# Patient Record
Sex: FEMALE | Race: OTHER | NOT HISPANIC OR LATINO
[De-identification: names, ages, dates, MRNs, and addresses within clinical notes are randomized per-mention and may not be internally consistent; named-entity substitution may affect disease eponyms.]

---

## 2022-08-30 ENCOUNTER — NON-APPOINTMENT (OUTPATIENT)
Age: 47
End: 2022-08-30

## 2022-08-31 PROBLEM — Z00.00 ENCOUNTER FOR PREVENTIVE HEALTH EXAMINATION: Status: ACTIVE | Noted: 2022-08-31

## 2022-09-01 ENCOUNTER — NON-APPOINTMENT (OUTPATIENT)
Age: 47
End: 2022-09-01

## 2022-09-01 ENCOUNTER — APPOINTMENT (OUTPATIENT)
Dept: GASTROENTEROLOGY | Facility: CLINIC | Age: 47
End: 2022-09-01

## 2022-09-01 VITALS
DIASTOLIC BLOOD PRESSURE: 72 MMHG | BODY MASS INDEX: 26.9 KG/M2 | SYSTOLIC BLOOD PRESSURE: 108 MMHG | OXYGEN SATURATION: 97 % | WEIGHT: 137 LBS | TEMPERATURE: 98.5 F | HEART RATE: 76 BPM | HEIGHT: 60 IN

## 2022-09-01 DIAGNOSIS — Z01.818 ENCOUNTER FOR OTHER PREPROCEDURAL EXAMINATION: ICD-10-CM

## 2022-09-01 DIAGNOSIS — Z12.11 ENCOUNTER FOR SCREENING FOR MALIGNANT NEOPLASM OF COLON: ICD-10-CM

## 2022-09-01 PROCEDURE — 99203 OFFICE O/P NEW LOW 30 MIN: CPT

## 2022-09-01 RX ORDER — POLYETHYLENE GLYCOL-3350 AND ELECTROLYTES 236; 6.74; 5.86; 2.97; 22.74 G/274.31G; G/274.31G; G/274.31G; G/274.31G; G/274.31G
236 POWDER, FOR SOLUTION ORAL
Qty: 1 | Refills: 0 | Status: ACTIVE | COMMUNITY
Start: 2022-09-01 | End: 1900-01-01

## 2022-09-02 LAB
ALBUMIN SERPL ELPH-MCNC: 5 G/DL
ALP BLD-CCNC: 65 U/L
ALT SERPL-CCNC: 40 U/L
AST SERPL-CCNC: 29 U/L
BASOPHILS # BLD AUTO: 0.02 K/UL
BASOPHILS NFR BLD AUTO: 0.3 %
BILIRUB DIRECT SERPL-MCNC: 0.2 MG/DL
BILIRUB INDIRECT SERPL-MCNC: 0.5 MG/DL
BILIRUB SERPL-MCNC: 0.7 MG/DL
EOSINOPHIL # BLD AUTO: 0.06 K/UL
EOSINOPHIL NFR BLD AUTO: 0.8 %
HCT VFR BLD CALC: 40.4 %
HGB BLD-MCNC: 12.8 G/DL
IMM GRANULOCYTES NFR BLD AUTO: 0.4 %
LYMPHOCYTES # BLD AUTO: 1.56 K/UL
LYMPHOCYTES NFR BLD AUTO: 19.5 %
MAN DIFF?: NORMAL
MCHC RBC-ENTMCNC: 30.8 PG
MCHC RBC-ENTMCNC: 31.7 GM/DL
MCV RBC AUTO: 97.1 FL
MONOCYTES # BLD AUTO: 0.43 K/UL
MONOCYTES NFR BLD AUTO: 5.4 %
NEUTROPHILS # BLD AUTO: 5.89 K/UL
NEUTROPHILS NFR BLD AUTO: 73.6 %
PLATELET # BLD AUTO: 255 K/UL
PROT SERPL-MCNC: 7.4 G/DL
RBC # BLD: 4.16 M/UL
RBC # FLD: 12.4 %
WBC # FLD AUTO: 7.99 K/UL

## 2022-09-08 NOTE — ASSESSMENT
[FreeTextEntry1] : This is a 47 year old female here for an evaluation of epigastric pain radiating to the back.\par Likely the pain is associated with gallbladder stones\par \par My plan\par -referral to Dr. Flores\par -colonoscopy\par -GaviLyte\par -covid swab\par -If removal of gallbladder does not resolve symptoms will consider EGD\par -c/w PPI\par \par Risks, benefits, and alternatives of colonoscopy discussed at length with patient including but not limited to bleeding , perforation, anesthesia related complication, aspiration, etc. Patient expressed understanding.\par \par Colonoscopy for evaluation of polyps, tumors, nodules with +/- biopsy and or cauterization w/ and or w/o clipping. \par

## 2022-09-08 NOTE — HISTORY OF PRESENT ILLNESS
[de-identified] : This is a 47 year old female here for an evaluation of epigastric pain radiating to the back. PMH of hysterectomy, gerd. Grandmother with ovarian cancer. Uncle with pancreatic cancer at age 70. Denies any difficulty swallowing. Has been experiencing reflux for many years. On Omeprazole 40mg daily with help. No N&V. Reports the epigastric pain is worse when eating greasy, acidic foods , and drinking alcoholic beverages. Had abdominal sono on 8/12/22 showed gallbladder filled with stones. Last EGD 16 years go and normal. No blood or dark tarry stools. Normal caliber. Never had an colonoscopy. Weight stable. \par Smoke: none\par Etoh: socially

## 2022-09-15 ENCOUNTER — TRANSCRIPTION ENCOUNTER (OUTPATIENT)
Age: 47
End: 2022-09-15

## 2022-09-27 ENCOUNTER — APPOINTMENT (OUTPATIENT)
Dept: SURGERY | Facility: CLINIC | Age: 47
End: 2022-09-27

## 2022-09-27 VITALS
WEIGHT: 141 LBS | TEMPERATURE: 97 F | HEART RATE: 75 BPM | BODY MASS INDEX: 27.68 KG/M2 | SYSTOLIC BLOOD PRESSURE: 119 MMHG | HEIGHT: 60 IN | DIASTOLIC BLOOD PRESSURE: 79 MMHG

## 2022-09-27 DIAGNOSIS — Z83.3 FAMILY HISTORY OF DIABETES MELLITUS: ICD-10-CM

## 2022-09-27 DIAGNOSIS — Z82.49 FAMILY HISTORY OF ISCHEMIC HEART DISEASE AND OTHER DISEASES OF THE CIRCULATORY SYSTEM: ICD-10-CM

## 2022-09-27 DIAGNOSIS — Z87.891 PERSONAL HISTORY OF NICOTINE DEPENDENCE: ICD-10-CM

## 2022-09-27 DIAGNOSIS — Z80.41 FAMILY HISTORY OF MALIGNANT NEOPLASM OF OVARY: ICD-10-CM

## 2022-09-27 DIAGNOSIS — Z80.3 FAMILY HISTORY OF MALIGNANT NEOPLASM OF BREAST: ICD-10-CM

## 2022-09-27 DIAGNOSIS — Z82.3 FAMILY HISTORY OF STROKE: ICD-10-CM

## 2022-09-27 DIAGNOSIS — Z78.9 OTHER SPECIFIED HEALTH STATUS: ICD-10-CM

## 2022-09-27 DIAGNOSIS — Z80.0 FAMILY HISTORY OF MALIGNANT NEOPLASM OF DIGESTIVE ORGANS: ICD-10-CM

## 2022-09-27 DIAGNOSIS — Z86.19 PERSONAL HISTORY OF OTHER INFECTIOUS AND PARASITIC DISEASES: ICD-10-CM

## 2022-09-27 DIAGNOSIS — Z80.42 FAMILY HISTORY OF MALIGNANT NEOPLASM OF PROSTATE: ICD-10-CM

## 2022-09-27 LAB — SARS-COV-2 N GENE NPH QL NAA+PROBE: NOT DETECTED

## 2022-09-27 PROCEDURE — 99203 OFFICE O/P NEW LOW 30 MIN: CPT

## 2022-09-29 ENCOUNTER — RESULT REVIEW (OUTPATIENT)
Age: 47
End: 2022-09-29

## 2022-09-29 ENCOUNTER — TRANSCRIPTION ENCOUNTER (OUTPATIENT)
Age: 47
End: 2022-09-29

## 2022-09-29 ENCOUNTER — INPATIENT (INPATIENT)
Facility: HOSPITAL | Age: 47
LOS: 0 days | Discharge: HOME | DRG: 419 | End: 2022-09-30
Attending: SURGERY | Admitting: SURGERY
Payer: MEDICAID

## 2022-09-29 VITALS
RESPIRATION RATE: 18 BRPM | WEIGHT: 139.99 LBS | HEART RATE: 85 BPM | DIASTOLIC BLOOD PRESSURE: 80 MMHG | HEIGHT: 60 IN | OXYGEN SATURATION: 99 % | SYSTOLIC BLOOD PRESSURE: 118 MMHG | TEMPERATURE: 98 F

## 2022-09-29 DIAGNOSIS — K80.50 CALCULUS OF BILE DUCT WITHOUT CHOLANGITIS OR CHOLECYSTITIS WITHOUT OBSTRUCTION: ICD-10-CM

## 2022-09-29 LAB
ABO RH CONFIRMATION: SIGNIFICANT CHANGE UP
ALBUMIN SERPL ELPH-MCNC: 4.3 G/DL — SIGNIFICANT CHANGE UP (ref 3.5–5)
ALP SERPL-CCNC: 105 U/L — SIGNIFICANT CHANGE UP (ref 40–120)
ALT FLD-CCNC: 831 U/L DA — HIGH (ref 10–60)
ANION GAP SERPL CALC-SCNC: 4 MMOL/L — LOW (ref 5–17)
APPEARANCE UR: CLEAR — SIGNIFICANT CHANGE UP
AST SERPL-CCNC: 842 U/L — HIGH (ref 10–40)
BASOPHILS # BLD AUTO: 0.01 K/UL — SIGNIFICANT CHANGE UP (ref 0–0.2)
BASOPHILS NFR BLD AUTO: 0.2 % — SIGNIFICANT CHANGE UP (ref 0–2)
BILIRUB SERPL-MCNC: 4 MG/DL — HIGH (ref 0.2–1.2)
BILIRUB UR-MCNC: ABNORMAL
BLD GP AB SCN SERPL QL: SIGNIFICANT CHANGE UP
BUN SERPL-MCNC: 8 MG/DL — SIGNIFICANT CHANGE UP (ref 7–18)
CALCIUM SERPL-MCNC: 9.9 MG/DL — SIGNIFICANT CHANGE UP (ref 8.4–10.5)
CHLORIDE SERPL-SCNC: 106 MMOL/L — SIGNIFICANT CHANGE UP (ref 96–108)
CO2 SERPL-SCNC: 27 MMOL/L — SIGNIFICANT CHANGE UP (ref 22–31)
COLOR SPEC: YELLOW — SIGNIFICANT CHANGE UP
CREAT SERPL-MCNC: 0.61 MG/DL — SIGNIFICANT CHANGE UP (ref 0.5–1.3)
DIFF PNL FLD: ABNORMAL
EGFR: 112 ML/MIN/1.73M2 — SIGNIFICANT CHANGE UP
EOSINOPHIL # BLD AUTO: 0.01 K/UL — SIGNIFICANT CHANGE UP (ref 0–0.5)
EOSINOPHIL NFR BLD AUTO: 0.2 % — SIGNIFICANT CHANGE UP (ref 0–6)
FLUAV AG NPH QL: SIGNIFICANT CHANGE UP
FLUBV AG NPH QL: SIGNIFICANT CHANGE UP
GLUCOSE SERPL-MCNC: 99 MG/DL — SIGNIFICANT CHANGE UP (ref 70–99)
GLUCOSE UR QL: NEGATIVE — SIGNIFICANT CHANGE UP
HCG SERPL-ACNC: <1 MIU/ML — SIGNIFICANT CHANGE UP
HCT VFR BLD CALC: 42.7 % — SIGNIFICANT CHANGE UP (ref 34.5–45)
HGB BLD-MCNC: 14.3 G/DL — SIGNIFICANT CHANGE UP (ref 11.5–15.5)
IMM GRANULOCYTES NFR BLD AUTO: 0.4 % — SIGNIFICANT CHANGE UP (ref 0–0.9)
KETONES UR-MCNC: NEGATIVE — SIGNIFICANT CHANGE UP
LEUKOCYTE ESTERASE UR-ACNC: NEGATIVE — SIGNIFICANT CHANGE UP
LIDOCAIN IGE QN: 75 U/L — SIGNIFICANT CHANGE UP (ref 73–393)
LYMPHOCYTES # BLD AUTO: 0.78 K/UL — LOW (ref 1–3.3)
LYMPHOCYTES # BLD AUTO: 14.8 % — SIGNIFICANT CHANGE UP (ref 13–44)
MCHC RBC-ENTMCNC: 30.2 PG — SIGNIFICANT CHANGE UP (ref 27–34)
MCHC RBC-ENTMCNC: 33.5 GM/DL — SIGNIFICANT CHANGE UP (ref 32–36)
MCV RBC AUTO: 90.3 FL — SIGNIFICANT CHANGE UP (ref 80–100)
MONOCYTES # BLD AUTO: 0.22 K/UL — SIGNIFICANT CHANGE UP (ref 0–0.9)
MONOCYTES NFR BLD AUTO: 4.2 % — SIGNIFICANT CHANGE UP (ref 2–14)
NEUTROPHILS # BLD AUTO: 4.23 K/UL — SIGNIFICANT CHANGE UP (ref 1.8–7.4)
NEUTROPHILS NFR BLD AUTO: 80.2 % — HIGH (ref 43–77)
NITRITE UR-MCNC: NEGATIVE — SIGNIFICANT CHANGE UP
NRBC # BLD: 0 /100 WBCS — SIGNIFICANT CHANGE UP (ref 0–0)
PH UR: 7 — SIGNIFICANT CHANGE UP (ref 5–8)
PLATELET # BLD AUTO: 267 K/UL — SIGNIFICANT CHANGE UP (ref 150–400)
POTASSIUM SERPL-MCNC: 4 MMOL/L — SIGNIFICANT CHANGE UP (ref 3.5–5.3)
POTASSIUM SERPL-SCNC: 4 MMOL/L — SIGNIFICANT CHANGE UP (ref 3.5–5.3)
PROT SERPL-MCNC: 8.3 G/DL — SIGNIFICANT CHANGE UP (ref 6–8.3)
PROT UR-MCNC: NEGATIVE — SIGNIFICANT CHANGE UP
RBC # BLD: 4.73 M/UL — SIGNIFICANT CHANGE UP (ref 3.8–5.2)
RBC # FLD: 11.9 % — SIGNIFICANT CHANGE UP (ref 10.3–14.5)
SARS-COV-2 RNA SPEC QL NAA+PROBE: SIGNIFICANT CHANGE UP
SODIUM SERPL-SCNC: 137 MMOL/L — SIGNIFICANT CHANGE UP (ref 135–145)
SP GR SPEC: 1.01 — SIGNIFICANT CHANGE UP (ref 1.01–1.02)
UROBILINOGEN FLD QL: 4
WBC # BLD: 5.27 K/UL — SIGNIFICANT CHANGE UP (ref 3.8–10.5)
WBC # FLD AUTO: 5.27 K/UL — SIGNIFICANT CHANGE UP (ref 3.8–10.5)

## 2022-09-29 PROCEDURE — 47564 LAPARO CHOLECYSTECTOMY/EXPLR: CPT

## 2022-09-29 PROCEDURE — 88304 TISSUE EXAM BY PATHOLOGIST: CPT | Mod: 26

## 2022-09-29 PROCEDURE — 76705 ECHO EXAM OF ABDOMEN: CPT | Mod: 26

## 2022-09-29 PROCEDURE — 93010 ELECTROCARDIOGRAM REPORT: CPT

## 2022-09-29 PROCEDURE — 47550 BILE DUCT ENDOSCOPY ADD-ON: CPT

## 2022-09-29 PROCEDURE — 99222 1ST HOSP IP/OBS MODERATE 55: CPT | Mod: 57

## 2022-09-29 PROCEDURE — 99284 EMERGENCY DEPT VISIT MOD MDM: CPT

## 2022-09-29 DEVICE — IMPLANTABLE DEVICE: Type: IMPLANTABLE DEVICE | Status: FUNCTIONAL

## 2022-09-29 DEVICE — AIRWAY BASKET ZERO TIP 12MM: Type: IMPLANTABLE DEVICE | Status: FUNCTIONAL

## 2022-09-29 DEVICE — CLIP APPLIER COVIDIEN ENDOCLIP II 10MM MED/LG: Type: IMPLANTABLE DEVICE | Status: FUNCTIONAL

## 2022-09-29 RX ORDER — KETOROLAC TROMETHAMINE 30 MG/ML
30 SYRINGE (ML) INJECTION EVERY 6 HOURS
Refills: 0 | Status: COMPLETED | OUTPATIENT
Start: 2022-09-29 | End: 2022-09-30

## 2022-09-29 RX ORDER — SODIUM CHLORIDE 9 MG/ML
1000 INJECTION, SOLUTION INTRAVENOUS
Refills: 0 | Status: DISCONTINUED | OUTPATIENT
Start: 2022-09-29 | End: 2022-09-29

## 2022-09-29 RX ORDER — ENOXAPARIN SODIUM 100 MG/ML
40 INJECTION SUBCUTANEOUS EVERY 24 HOURS
Refills: 0 | Status: DISCONTINUED | OUTPATIENT
Start: 2022-09-29 | End: 2022-09-29

## 2022-09-29 RX ORDER — ONDANSETRON 8 MG/1
4 TABLET, FILM COATED ORAL ONCE
Refills: 0 | Status: COMPLETED | OUTPATIENT
Start: 2022-09-29 | End: 2022-09-29

## 2022-09-29 RX ORDER — FENTANYL CITRATE 50 UG/ML
50 INJECTION INTRAVENOUS
Refills: 0 | Status: DISCONTINUED | OUTPATIENT
Start: 2022-09-29 | End: 2022-09-29

## 2022-09-29 RX ORDER — ACETAMINOPHEN 500 MG
1000 TABLET ORAL ONCE
Refills: 0 | Status: DISCONTINUED | OUTPATIENT
Start: 2022-09-29 | End: 2022-09-29

## 2022-09-29 RX ORDER — DEXTROSE MONOHYDRATE, SODIUM CHLORIDE, AND POTASSIUM CHLORIDE 50; .745; 4.5 G/1000ML; G/1000ML; G/1000ML
1000 INJECTION, SOLUTION INTRAVENOUS
Refills: 0 | Status: DISCONTINUED | OUTPATIENT
Start: 2022-09-29 | End: 2022-09-29

## 2022-09-29 RX ORDER — SODIUM CHLORIDE 9 MG/ML
1000 INJECTION INTRAMUSCULAR; INTRAVENOUS; SUBCUTANEOUS ONCE
Refills: 0 | Status: COMPLETED | OUTPATIENT
Start: 2022-09-29 | End: 2022-09-29

## 2022-09-29 RX ORDER — FENTANYL CITRATE 50 UG/ML
25 INJECTION INTRAVENOUS
Refills: 0 | Status: DISCONTINUED | OUTPATIENT
Start: 2022-09-29 | End: 2022-09-29

## 2022-09-29 RX ORDER — ONDANSETRON 8 MG/1
4 TABLET, FILM COATED ORAL EVERY 6 HOURS
Refills: 0 | Status: DISCONTINUED | OUTPATIENT
Start: 2022-09-29 | End: 2022-09-29

## 2022-09-29 RX ORDER — ONDANSETRON 8 MG/1
4 TABLET, FILM COATED ORAL ONCE
Refills: 0 | Status: DISCONTINUED | OUTPATIENT
Start: 2022-09-29 | End: 2022-09-29

## 2022-09-29 RX ORDER — MORPHINE SULFATE 50 MG/1
4 CAPSULE, EXTENDED RELEASE ORAL ONCE
Refills: 0 | Status: DISCONTINUED | OUTPATIENT
Start: 2022-09-29 | End: 2022-09-29

## 2022-09-29 RX ORDER — HYDROMORPHONE HYDROCHLORIDE 2 MG/ML
0.5 INJECTION INTRAMUSCULAR; INTRAVENOUS; SUBCUTANEOUS
Refills: 0 | Status: DISCONTINUED | OUTPATIENT
Start: 2022-09-29 | End: 2022-09-29

## 2022-09-29 RX ORDER — HYDROMORPHONE HYDROCHLORIDE 2 MG/ML
0.5 INJECTION INTRAMUSCULAR; INTRAVENOUS; SUBCUTANEOUS
Refills: 0 | Status: DISCONTINUED | OUTPATIENT
Start: 2022-09-29 | End: 2022-09-30

## 2022-09-29 RX ADMIN — ONDANSETRON 4 MILLIGRAM(S): 8 TABLET, FILM COATED ORAL at 10:40

## 2022-09-29 RX ADMIN — SODIUM CHLORIDE 1000 MILLILITER(S): 9 INJECTION INTRAMUSCULAR; INTRAVENOUS; SUBCUTANEOUS at 09:23

## 2022-09-29 RX ADMIN — MORPHINE SULFATE 4 MILLIGRAM(S): 50 CAPSULE, EXTENDED RELEASE ORAL at 13:23

## 2022-09-29 RX ADMIN — HYDROMORPHONE HYDROCHLORIDE 0.5 MILLIGRAM(S): 2 INJECTION INTRAMUSCULAR; INTRAVENOUS; SUBCUTANEOUS at 20:19

## 2022-09-29 RX ADMIN — MORPHINE SULFATE 4 MILLIGRAM(S): 50 CAPSULE, EXTENDED RELEASE ORAL at 10:41

## 2022-09-29 RX ADMIN — Medication 30 MILLIGRAM(S): at 23:45

## 2022-09-29 RX ADMIN — HYDROMORPHONE HYDROCHLORIDE 0.5 MILLIGRAM(S): 2 INJECTION INTRAMUSCULAR; INTRAVENOUS; SUBCUTANEOUS at 20:35

## 2022-09-29 RX ADMIN — SODIUM CHLORIDE 1000 MILLILITER(S): 9 INJECTION INTRAMUSCULAR; INTRAVENOUS; SUBCUTANEOUS at 13:23

## 2022-09-29 RX ADMIN — Medication 30 MILLIGRAM(S): at 23:10

## 2022-09-29 NOTE — BRIEF OPERATIVE NOTE - OPERATION/FINDINGS
Laparoscopic cholecystectomy, intraoperative cholangiogram, exploration of common bile, removal of common bile duct stone

## 2022-09-29 NOTE — H&P ADULT - NSHPLABSRESULTS_GEN_ALL_CORE
14.3   5.27  )-----------( 267      ( 29 Sep 2022 10:10 )             42.7   09-29    137  |  106  |  8   ----------------------------<  99  4.0   |  27  |  0.61    Ca    9.9      29 Sep 2022 10:10    TPro  8.3  /  Alb  4.3  /  TBili  4.0<H>  /  DBili  x   /  AST  842<H>  /  ALT  831<H>  /  AlkPhos  105  09-29        < from: US Abdomen Upper Quadrant Right (09.29.22 @ 11:48) >      ACC: [id number] EXAM:  US ABDOMEN RT UPR QUADRANT                          PROCEDURE DATE:  09/29/2022          INTERPRETATION:  CLINICAL INFORMATION: Right upper quadrant abdominal   pain.    COMPARISON: None available.    TECHNIQUE: Sonography of the right upper quadrant.    FINDINGS:  Liver: Increased echogenicity.  Bile ducts: Normal caliber. Common bile duct measures 5 mm.  Gallbladder: Cholelithiasis.  Pancreas: Visualized portions are within normal limits.  Right kidney: 11.6 cm. No hydronephrosis.  Ascites: None.  IVC: Visualized portions are within normal limits.    IMPRESSION:  Cholelithiasis.  Hepatic steatosis    --- End of Report --      DEREK JAIN MD; Attending Radiologist  This document has been electronically signed. Sep 29 2022 12:27PM    < end of copied text >

## 2022-09-29 NOTE — ED ADULT NURSE NOTE - COVID-19 RESULT DATE/TIME
23-Sep-2022 19:47

## 2022-09-29 NOTE — ED ADULT NURSE NOTE - EXTENSIONS OF SELF_ADULT
None

## 2022-09-29 NOTE — PACU DISCHARGE NOTE - NAUSEA/VOMITING:
Left message for Nancy to call back and reschedule appointment with Lisa Velazquez to another day.    None

## 2022-09-29 NOTE — ED PROVIDER NOTE - OBJECTIVE STATEMENT
47 year old female lmp Oct 2018 with pmhx of cholecystolithiasis and acid reflux presents with constant sever (10/10) upper right quadrant abd pain since last night.  She's on omeprazole for acid reflux. Reports nausea, vomiting x1, diarrhea. Denies fever. Patient took Tylenol but finds no relief. Denies previous abdominal surgery. She has surgery scheduled in October.  NKDA.

## 2022-09-29 NOTE — H&P ADULT - HISTORY OF PRESENT ILLNESS
47F w/ PSHx hysterectomy presenting c/o RUQ and epigastric pain associated with nausea/vomiting x1 day. Pt states pain is radiating to her right shoulder and back. States pain began at 1pm, however worsened overnight. Denied fever or chills.

## 2022-09-29 NOTE — ED ADULT NURSE NOTE - NS_SISCREENINGSR_GEN_ALL_ED
Brief Postoperative Note    Patient: Bernabe Rodriguez  YOB: 1957  MRN: [id number]    Date of Procedure: 5/13/2021     Pre-Op Diagnosis: SCREENING    Post-Op Diagnosis: Same as preoperative diagnosis. Procedure(s):  COLONOSCOPY    Surgeon(s):  Kathya Gorman MD    Surgical Assistant: Surg Asst-1: Sydney Blandon    Anesthesia: MAC     Estimated Blood Loss (mL): 0    Complications: None    Specimens: * No specimens in log *     Implants: * No implants in log *    Drains: * No LDAs found *    Findings: 1. Diverticulosis  2.  Internal hemorrhoids     Electronically Signed by Fredrick Gross MD on 5/13/2021 at 11:52 AM Negative

## 2022-09-29 NOTE — CHART NOTE - NSCHARTNOTEFT_GEN_A_CORE
Pt POD 0 s/p lap tania  resting comfortably  no n/v  voided    Vital Signs Last 24 Hrs  T(C): 36.5 (29 Sep 2022 21:31), Max: 36.8 (29 Sep 2022 20:50)  T(F): 97.7 (29 Sep 2022 21:31), Max: 98.2 (29 Sep 2022 20:50)  HR: 67 (29 Sep 2022 21:31) (63 - 85)  BP: 99/58 (29 Sep 2022 21:31) (99/58 - 124/73)  BP(mean): 68 (29 Sep 2022 20:50) (68 - 85)  RR: 18 (29 Sep 2022 21:31) (10 - 18)  SpO2: 96% (29 Sep 2022 21:31) (95% - 100%)    Parameters below as of 29 Sep 2022 21:31  Patient On (Oxygen Delivery Method): room air    abd soft, NT, inc CDI    stable post-op

## 2022-09-29 NOTE — H&P ADULT - ASSESSMENT
47F with cholelithiasis, r/o choledocholithiasis    no leukocytosis, afebrile  minimal RUQ +ttp  lipase wnl, Tbili 4    -Admit to surgery under Dr. Flores  -Plan for laparoscopic cholecystectomy today with IOC  -NPO, IVF  -Pain control PRN  -DVT prophylaxis

## 2022-09-29 NOTE — H&P ADULT - NSHPPHYSICALEXAM_GEN_ALL_CORE
Vital Signs Last 24 Hrs  T(C): 36.6 (29 Sep 2022 11:29), Max: 36.7 (29 Sep 2022 08:59)  T(F): 97.9 (29 Sep 2022 11:29), Max: 98.1 (29 Sep 2022 08:59)  HR: 70 (29 Sep 2022 11:29) (70 - 85)  BP: 105/68 (29 Sep 2022 11:29) (105/68 - 118/80)  RR: 17 (29 Sep 2022 11:29) (17 - 18)  SpO2: 98% (29 Sep 2022 11:29) (98% - 99%)    Parameters below as of 29 Sep 2022 11:29  Patient On (Oxygen Delivery Method): room air    Gen: A&Ox3, NAD  Chest: respiration unlabored  Abdomen: soft, nondistended, mild RUQ abdominal pain. No guarding  Ext: no calf pain, no edema

## 2022-09-30 ENCOUNTER — TRANSCRIPTION ENCOUNTER (OUTPATIENT)
Age: 47
End: 2022-09-30

## 2022-09-30 VITALS
DIASTOLIC BLOOD PRESSURE: 63 MMHG | OXYGEN SATURATION: 98 % | RESPIRATION RATE: 18 BRPM | SYSTOLIC BLOOD PRESSURE: 98 MMHG | TEMPERATURE: 98 F | HEART RATE: 84 BPM

## 2022-09-30 LAB
CULTURE RESULTS: SIGNIFICANT CHANGE UP
SPECIMEN SOURCE: SIGNIFICANT CHANGE UP

## 2022-09-30 PROCEDURE — 87637 SARSCOV2&INF A&B&RSV AMP PRB: CPT

## 2022-09-30 PROCEDURE — 76705 ECHO EXAM OF ABDOMEN: CPT

## 2022-09-30 PROCEDURE — 99285 EMERGENCY DEPT VISIT HI MDM: CPT | Mod: 25

## 2022-09-30 PROCEDURE — 86850 RBC ANTIBODY SCREEN: CPT

## 2022-09-30 PROCEDURE — 96361 HYDRATE IV INFUSION ADD-ON: CPT

## 2022-09-30 PROCEDURE — 83690 ASSAY OF LIPASE: CPT

## 2022-09-30 PROCEDURE — 87086 URINE CULTURE/COLONY COUNT: CPT

## 2022-09-30 PROCEDURE — 96375 TX/PRO/DX INJ NEW DRUG ADDON: CPT

## 2022-09-30 PROCEDURE — 84702 CHORIONIC GONADOTROPIN TEST: CPT

## 2022-09-30 PROCEDURE — 36415 COLL VENOUS BLD VENIPUNCTURE: CPT

## 2022-09-30 PROCEDURE — 96374 THER/PROPH/DIAG INJ IV PUSH: CPT

## 2022-09-30 PROCEDURE — 86900 BLOOD TYPING SEROLOGIC ABO: CPT

## 2022-09-30 PROCEDURE — 88304 TISSUE EXAM BY PATHOLOGIST: CPT

## 2022-09-30 PROCEDURE — 86901 BLOOD TYPING SEROLOGIC RH(D): CPT

## 2022-09-30 PROCEDURE — 76000 FLUOROSCOPY <1 HR PHYS/QHP: CPT

## 2022-09-30 PROCEDURE — C1889: CPT

## 2022-09-30 PROCEDURE — 85025 COMPLETE CBC W/AUTO DIFF WBC: CPT

## 2022-09-30 PROCEDURE — 80053 COMPREHEN METABOLIC PANEL: CPT

## 2022-09-30 PROCEDURE — 81001 URINALYSIS AUTO W/SCOPE: CPT

## 2022-09-30 RX ORDER — ACETAMINOPHEN 500 MG
1000 TABLET ORAL ONCE
Refills: 0 | Status: COMPLETED | OUTPATIENT
Start: 2022-09-30 | End: 2022-09-30

## 2022-09-30 RX ADMIN — Medication 30 MILLIGRAM(S): at 06:05

## 2022-09-30 RX ADMIN — Medication 30 MILLIGRAM(S): at 05:26

## 2022-09-30 RX ADMIN — Medication 400 MILLIGRAM(S): at 07:01

## 2022-09-30 RX ADMIN — Medication 30 MILLIGRAM(S): at 11:00

## 2022-09-30 RX ADMIN — Medication 30 MILLIGRAM(S): at 11:21

## 2022-09-30 NOTE — DISCHARGE NOTE PROVIDER - NSDCFUSCHEDAPPT_GEN_ALL_CORE_FT
Chest x-ray no masses , , NO Pneumonia       Suggest a trail of Acid reflux medications .       
Jose Thompson [facility] PreAdmits  Scheduled Appointment: 09/30/2022    [facility]  [phone]   Scheduled Appointment: 11/03/2022    [facility]  [phone]  Scheduled Appointment: 11/14/2022

## 2022-09-30 NOTE — DISCHARGE NOTE NURSING/CASE MANAGEMENT/SOCIAL WORK - NSDCPEFALRISK_GEN_ALL_CORE
For information on Fall & Injury Prevention, visit: [email or web address] OR  [email or web address] OR  https://www.cdc.gov/steadi/patient.html

## 2022-09-30 NOTE — DISCHARGE NOTE PROVIDER - NSDCCPTREATMENT_GEN_ALL_CORE_FT
PRINCIPAL PROCEDURE  Procedure: Cholecystectomy, laparoscopic, with intraoperative cholangiogram and biliary tract exploration  Findings and Treatment:

## 2022-09-30 NOTE — DISCHARGE NOTE PROVIDER - CARE PROVIDER_API CALL
Vivek Flores (MD)  Surgery  [address] [place] [address]  Phone: [phone]  Fax: [phone]  Follow Up Time:

## 2022-09-30 NOTE — PATIENT PROFILE ADULT - FALL HARM RISK - HARM RISK INTERVENTIONS

## 2022-09-30 NOTE — PROGRESS NOTE ADULT - SUBJECTIVE AND OBJECTIVE BOX
Patient seen and examined at bedside, POD1 laparoscopic tania, IOC, CBD exploration, removal of CBD stone with Spyglass. No events overnight, has minimal pain primarily to epigastric port site that is well controlled on her medication regimen. Has not ambulated or attempted PO yet. Denies additional complaint, no CP, SOB, n/v/d.    Vital Signs Last 24 Hrs  T(C): 35.6 (30 Sep 2022 06:16), Max: 36.8 (29 Sep 2022 20:50)  T(F): 96 (30 Sep 2022 06:16), Max: 98.2 (29 Sep 2022 20:50)  HR: 70 (30 Sep 2022 06:16) (63 - 85)  BP: 97/60 (30 Sep 2022 06:16) (95/51 - 124/73)  BP(mean): 71 (30 Sep 2022 00:01) (68 - 85)  RR: 18 (30 Sep 2022 06:16) (10 - 18)  SpO2: 100% (30 Sep 2022 06:16) (94% - 100%)    Parameters below as of 30 Sep 2022 06:16  Patient On (Oxygen Delivery Method): room air    Gen: well appearing, no acute distress, sleeping comfortably  Neuro: A+Ox3  Resp: normal resp effort, non-labored  Abd: 5 port sites well approximated, covered in steri-strips, no bleeding. mildly tender to palpation primarily in epigastrium. soft, nondistended  Ext: FROM all 4 extremities    A/P  47F POD1 lap tania with IOC, CBD exploration, and removal of CBD stones, recovering well without concern  - monitor PO intake  - ambulate  - void  - monitor vitals  - pain control prn  - dc home in pm with follow up

## 2022-09-30 NOTE — DISCHARGE NOTE PROVIDER - HOSPITAL COURSE
47F w/ PSHx hysterectomy presented to ED complaining of RUQ and epigastric pain associated with nausea/vomiting for 1 day. Pt stated pain is radiating to her right shoulder and back. States pain began at 1pm, however worsened overnight. Ultrasound showed cholelithiasis and mild CBD dilation concerning for retained stone and pt was brought for lap tania with IOC. She was found to have filling defect on IOC and CBD exploration was performed, and stone was removed. Pt recovered well post-operatively, she remained afebrile and hemodynamically stable, her pain was well-controlled and she tolerated diet, ambulated without issue, and voided. She was discharged on post-op day 1 in good condition and with close follow up.

## 2022-09-30 NOTE — DISCHARGE NOTE NURSING/CASE MANAGEMENT/SOCIAL WORK - PATIENT PORTAL LINK FT
You can access the FollowMyHealth Patient Portal offered by [facility] by registering at the following website: [email or web address]. By joining [facility]’s FollowMyHealth portal, you will also be able to view your health information using other applications (apps) compatible with our system.

## 2022-10-03 PROBLEM — K21.9 GASTRO-ESOPHAGEAL REFLUX DISEASE WITHOUT ESOPHAGITIS: Chronic | Status: ACTIVE | Noted: 2022-09-29

## 2022-10-03 PROBLEM — K80.20 CALCULUS OF GALLBLADDER WITHOUT CHOLECYSTITIS WITHOUT OBSTRUCTION: Chronic | Status: ACTIVE | Noted: 2022-09-29

## 2022-10-04 LAB — SURGICAL PATHOLOGY STUDY: SIGNIFICANT CHANGE UP

## 2022-10-05 NOTE — HISTORY OF PRESENT ILLNESS
[de-identified] : 48 yo woman referred by GI Dr Thompson for symptomatic cholelithiasis. She reports RUQ abdominal pain radiating to the back related to eating fatty foods. US shows gallstones.

## 2022-10-05 NOTE — PHYSICAL EXAM
[Normal Breath Sounds] : Normal breath sounds [Normal Heart Sounds] : normal heart sounds [No Rash or Lesion] : No rash or lesion [Alert] : alert [Calm] : calm [de-identified] : NAD [de-identified] : WNL [de-identified] : ANNEL [de-identified] : Abdomen ,soft, minimal RUQ tenderness, no palpable mass

## 2022-10-05 NOTE — ASSESSMENT
[FreeTextEntry1] : 46 yo woman with symptomatic cholelithiasis. \par Plan for robotic cholecystectomy

## 2022-10-11 ENCOUNTER — APPOINTMENT (OUTPATIENT)
Dept: SURGERY | Facility: CLINIC | Age: 47
End: 2022-10-11

## 2022-10-11 VITALS
DIASTOLIC BLOOD PRESSURE: 74 MMHG | WEIGHT: 140 LBS | HEART RATE: 80 BPM | BODY MASS INDEX: 27.48 KG/M2 | HEIGHT: 60 IN | SYSTOLIC BLOOD PRESSURE: 112 MMHG

## 2022-10-11 PROCEDURE — 99024 POSTOP FOLLOW-UP VISIT: CPT

## 2022-10-11 NOTE — REASON FOR VISIT
[Post Op: _________] : a [unfilled] post op visit [FreeTextEntry1] : s/p Laparoscopic cholecystectomy with cholangiography. Laparoscopic transcystic common bile duct exploration.Choledochoscopy.\par

## 2022-10-11 NOTE — ASSESSMENT
[FreeTextEntry1] : ELENITA SNOW is a 47 year old female who underwent a Laparoscopic cholecystectomy with cholangiography. Laparoscopic transcystic common bile duct exploration.Choledochoscopy on 09/30/2022 pathology results are consistent with Chronic calculous cholecystitis\par \par She has had an uneventful recovery. \par \par Plan to check LFTs\par f/u PRN

## 2022-10-11 NOTE — PHYSICAL EXAM
[Normal Breath Sounds] : Normal breath sounds [Normal Heart Sounds] : normal heart sounds [No Rash or Lesion] : No rash or lesion [Alert] : alert [Calm] : calm [de-identified] : NAD [de-identified] : WNL [de-identified] : ANNEL [de-identified] : Incisions healing well, small bruising around the 5-mm laparoscope port site.

## 2022-10-11 NOTE — HISTORY OF PRESENT ILLNESS
[de-identified] : ELENITA SNOW is a 47 year old female who presents in the office for postop  visit. She underwent a Laparoscopic cholecystectomy with cholangiography. Laparoscopic transcystic common bile duct exploration.Choledochoscopy on 09/30/2022 pathology results are consistent with Chronic calculous cholecystitis. \par \par She is doing well, on regular diet, afebrile, anicteric, normal BMs, reports "gas". She is concerned about some bruising and discomfort around the 5-mm laparoscope port. There is no palpable hematoma.

## 2022-10-21 ENCOUNTER — EMERGENCY (EMERGENCY)
Facility: HOSPITAL | Age: 47
LOS: 1 days | Discharge: HOME | End: 2022-10-21
Attending: EMERGENCY MEDICINE
Payer: MEDICAID

## 2022-10-21 VITALS
RESPIRATION RATE: 16 BRPM | TEMPERATURE: 98 F | DIASTOLIC BLOOD PRESSURE: 71 MMHG | HEIGHT: 60 IN | OXYGEN SATURATION: 97 % | HEART RATE: 86 BPM | SYSTOLIC BLOOD PRESSURE: 107 MMHG | WEIGHT: 139.99 LBS

## 2022-10-21 LAB
ALBUMIN SERPL ELPH-MCNC: 4.2 G/DL — SIGNIFICANT CHANGE UP (ref 3.5–5)
ALP SERPL-CCNC: 66 U/L — SIGNIFICANT CHANGE UP (ref 40–120)
ALT FLD-CCNC: 28 U/L DA — SIGNIFICANT CHANGE UP (ref 10–60)
ANION GAP SERPL CALC-SCNC: 6 MMOL/L — SIGNIFICANT CHANGE UP (ref 5–17)
AST SERPL-CCNC: 16 U/L — SIGNIFICANT CHANGE UP (ref 10–40)
BASOPHILS # BLD AUTO: 0.01 K/UL — SIGNIFICANT CHANGE UP (ref 0–0.2)
BASOPHILS NFR BLD AUTO: 0.2 % — SIGNIFICANT CHANGE UP (ref 0–2)
BILIRUB SERPL-MCNC: 0.7 MG/DL — SIGNIFICANT CHANGE UP (ref 0.2–1.2)
BUN SERPL-MCNC: 17 MG/DL — SIGNIFICANT CHANGE UP (ref 7–18)
CALCIUM SERPL-MCNC: 9.6 MG/DL — SIGNIFICANT CHANGE UP (ref 8.4–10.5)
CHLORIDE SERPL-SCNC: 106 MMOL/L — SIGNIFICANT CHANGE UP (ref 96–108)
CO2 SERPL-SCNC: 27 MMOL/L — SIGNIFICANT CHANGE UP (ref 22–31)
CREAT SERPL-MCNC: 0.6 MG/DL — SIGNIFICANT CHANGE UP (ref 0.5–1.3)
EGFR: 112 ML/MIN/1.73M2 — SIGNIFICANT CHANGE UP
EOSINOPHIL # BLD AUTO: 0.05 K/UL — SIGNIFICANT CHANGE UP (ref 0–0.5)
EOSINOPHIL NFR BLD AUTO: 0.9 % — SIGNIFICANT CHANGE UP (ref 0–6)
GLUCOSE SERPL-MCNC: 90 MG/DL — SIGNIFICANT CHANGE UP (ref 70–99)
HCG UR QL: NEGATIVE — SIGNIFICANT CHANGE UP
HCT VFR BLD CALC: 39.6 % — SIGNIFICANT CHANGE UP (ref 34.5–45)
HGB BLD-MCNC: 12.8 G/DL — SIGNIFICANT CHANGE UP (ref 11.5–15.5)
IMM GRANULOCYTES NFR BLD AUTO: 0.2 % — SIGNIFICANT CHANGE UP (ref 0–0.9)
LACTATE SERPL-SCNC: 0.9 MMOL/L — SIGNIFICANT CHANGE UP (ref 0.7–2)
LIDOCAIN IGE QN: 83 U/L — SIGNIFICANT CHANGE UP (ref 73–393)
LYMPHOCYTES # BLD AUTO: 1.46 K/UL — SIGNIFICANT CHANGE UP (ref 1–3.3)
LYMPHOCYTES # BLD AUTO: 25.4 % — SIGNIFICANT CHANGE UP (ref 13–44)
MCHC RBC-ENTMCNC: 29.8 PG — SIGNIFICANT CHANGE UP (ref 27–34)
MCHC RBC-ENTMCNC: 32.3 GM/DL — SIGNIFICANT CHANGE UP (ref 32–36)
MCV RBC AUTO: 92.1 FL — SIGNIFICANT CHANGE UP (ref 80–100)
MONOCYTES # BLD AUTO: 0.4 K/UL — SIGNIFICANT CHANGE UP (ref 0–0.9)
MONOCYTES NFR BLD AUTO: 7 % — SIGNIFICANT CHANGE UP (ref 2–14)
NEUTROPHILS # BLD AUTO: 3.82 K/UL — SIGNIFICANT CHANGE UP (ref 1.8–7.4)
NEUTROPHILS NFR BLD AUTO: 66.3 % — SIGNIFICANT CHANGE UP (ref 43–77)
NRBC # BLD: 0 /100 WBCS — SIGNIFICANT CHANGE UP (ref 0–0)
PLATELET # BLD AUTO: 273 K/UL — SIGNIFICANT CHANGE UP (ref 150–400)
POTASSIUM SERPL-MCNC: 3.9 MMOL/L — SIGNIFICANT CHANGE UP (ref 3.5–5.3)
POTASSIUM SERPL-SCNC: 3.9 MMOL/L — SIGNIFICANT CHANGE UP (ref 3.5–5.3)
PROT SERPL-MCNC: 7.8 G/DL — SIGNIFICANT CHANGE UP (ref 6–8.3)
RBC # BLD: 4.3 M/UL — SIGNIFICANT CHANGE UP (ref 3.8–5.2)
RBC # FLD: 11.9 % — SIGNIFICANT CHANGE UP (ref 10.3–14.5)
SARS-COV-2 RNA SPEC QL NAA+PROBE: SIGNIFICANT CHANGE UP
SODIUM SERPL-SCNC: 139 MMOL/L — SIGNIFICANT CHANGE UP (ref 135–145)
WBC # BLD: 5.75 K/UL — SIGNIFICANT CHANGE UP (ref 3.8–10.5)
WBC # FLD AUTO: 5.75 K/UL — SIGNIFICANT CHANGE UP (ref 3.8–10.5)

## 2022-10-21 PROCEDURE — 96374 THER/PROPH/DIAG INJ IV PUSH: CPT

## 2022-10-21 PROCEDURE — 81025 URINE PREGNANCY TEST: CPT

## 2022-10-21 PROCEDURE — 99284 EMERGENCY DEPT VISIT MOD MDM: CPT | Mod: 25

## 2022-10-21 PROCEDURE — 71045 X-RAY EXAM CHEST 1 VIEW: CPT | Mod: 26

## 2022-10-21 PROCEDURE — 71045 X-RAY EXAM CHEST 1 VIEW: CPT

## 2022-10-21 PROCEDURE — 84702 CHORIONIC GONADOTROPIN TEST: CPT

## 2022-10-21 PROCEDURE — 80053 COMPREHEN METABOLIC PANEL: CPT

## 2022-10-21 PROCEDURE — 85025 COMPLETE CBC W/AUTO DIFF WBC: CPT

## 2022-10-21 PROCEDURE — 87040 BLOOD CULTURE FOR BACTERIA: CPT

## 2022-10-21 PROCEDURE — 83690 ASSAY OF LIPASE: CPT

## 2022-10-21 PROCEDURE — 99285 EMERGENCY DEPT VISIT HI MDM: CPT

## 2022-10-21 PROCEDURE — 87635 SARS-COV-2 COVID-19 AMP PRB: CPT

## 2022-10-21 PROCEDURE — 83605 ASSAY OF LACTIC ACID: CPT

## 2022-10-21 PROCEDURE — 87086 URINE CULTURE/COLONY COUNT: CPT

## 2022-10-21 PROCEDURE — 36415 COLL VENOUS BLD VENIPUNCTURE: CPT

## 2022-10-21 RX ORDER — SODIUM CHLORIDE 9 MG/ML
1000 INJECTION INTRAMUSCULAR; INTRAVENOUS; SUBCUTANEOUS ONCE
Refills: 0 | Status: COMPLETED | OUTPATIENT
Start: 2022-10-21 | End: 2022-10-21

## 2022-10-21 RX ORDER — MORPHINE SULFATE 50 MG/1
2 CAPSULE, EXTENDED RELEASE ORAL ONCE
Refills: 0 | Status: DISCONTINUED | OUTPATIENT
Start: 2022-10-21 | End: 2022-10-21

## 2022-10-21 RX ADMIN — MORPHINE SULFATE 2 MILLIGRAM(S): 50 CAPSULE, EXTENDED RELEASE ORAL at 11:07

## 2022-10-21 RX ADMIN — SODIUM CHLORIDE 1000 MILLILITER(S): 9 INJECTION INTRAMUSCULAR; INTRAVENOUS; SUBCUTANEOUS at 11:04

## 2022-10-21 NOTE — ED PROVIDER NOTE - PHYSICAL EXAMINATION
Gen.  no acute distress  HEENT:  perrl eomi  Lungs:  b/l bs  CVS: S1S2   Abd;  soft no guarding. mild erythema around dressing sites consistent with skin reaction. central incision site with clear / yellow drainage min. +ttp over lower abd  Ext:  no edema no erythema  Neuro: aaox3   MSK: strength 5/5 b/l  upper and lower ext.

## 2022-10-21 NOTE — CONSULT NOTE ADULT - ATTENDING COMMENTS
Patient presents with opening of the lower camera port site. There is no purulent discharge, no redness, no signs of infection.     No further intervention needed - patient will follow up in my office.   Wound care instructions given to patient.

## 2022-10-21 NOTE — ED PROVIDER NOTE - CLINICAL SUMMARY MEDICAL DECISION MAKING FREE TEXT BOX
ATTG: : abd pain / fever / recent surgery concerns include but not limited to infection / inflammation / asbcess / obstruction will check labs, check ct check cultures,urinalysis and re eval for dispo

## 2022-10-21 NOTE — CONSULT NOTE ADULT - SUBJECTIVE AND OBJECTIVE BOX
47F with history of laparoscopic cholecystectomy with intraoperative cholangiogram that was positive for filling defect, common bile duct exploration, and removal of duct stones, presenting with incisional pain and fever to 101. Patient reports the pain around her incisions improved over her post-op period but never fully went away, and was always worst over the lower abdominal port site. She never removed any of the steri strips until yesterday when she saw some green/yellow discharge mixed with blood from the lower abdominal port site incision, and redness around it. At that time she took her temperature which was over 101. She is tolerating PO without nausea, vomiting, or increase in her abdominal pain, denies changes in bowel habits or urinary complaint.    Vital Signs Last 24 Hrs  T(C): 36.7 (21 Oct 2022 09:07), Max: 36.7 (21 Oct 2022 09:07)  T(F): 98 (21 Oct 2022 09:07), Max: 98 (21 Oct 2022 09:07)  HR: 86 (21 Oct 2022 09:07) (86 - 86)  BP: 107/71 (21 Oct 2022 09:07) (107/71 - 107/71)  BP(mean): --  RR: 16 (21 Oct 2022 09:07) (16 - 16)  SpO2: 97% (21 Oct 2022 09:07) (97% - 97%)  Parameters below as of 21 Oct 2022 09:07  Patient On (Oxygen Delivery Method): room air    General: well appearing, no acute distress  Resp: non-labored, normal resp effort  Abd: 4 port sites, lower abdominal port site with evidence of discharge that was crusted over, skin not well healed, blood able to be expressed but no additional discharge, no induration, no fluctuance. 3 other port sites well healed, mildly tender and w minor skin irritation  Ext: FROM all four extremities, peripheral pulses palpable    LABS             12.8   5.75  )-----------( 273      ( 21 Oct 2022 10:50 )             39.6     139  |  106  |  17  ----------------------------<  90  3.9   |  27  |  0.60    Ca    9.6      21 Oct 2022 10:50    TPro  7.8  /  Alb  4.2  /  TBili  0.7  /  DBili  x   /  AST  16  /  ALT  28  /  AlkPhos  66  10-21    IMAGING  CXR: unremarkable  CT abd/pel: pending

## 2022-10-21 NOTE — ED ADULT TRIAGE NOTE - CHIEF COMPLAINT QUOTE
s/p gallbladder surgery 9/29/22 with Dr Flores reports abdominal pain, fever overnight of 101 and one of my incisions had pus last night. Tylenol taken last at 6am

## 2022-10-21 NOTE — ED PROVIDER NOTE - PROGRESS NOTE DETAILS
patient signed out to me by Dr. Love pending CT scan. patient spoke with her surgeon Dr. Flores who did not recommend a CT scan. patient now does not wish to have scan done. reports pain improved and is non-tender on re-exam. discussed pros and cons with patient, but patient ultimately declines CT scan. patient is competent to make clinical decisions. given strict return precautions. raoul Iglesias

## 2022-10-21 NOTE — ED PROVIDER NOTE - OBJECTIVE STATEMENT
47-year-old female with a past medical history of reflux, cholelithiasis s/p cholecystectomy, on September 28 after she was found to have mild CBD dilatation which was concerning for retained stone and had a lap tania with IOC.  She was found to have a filling defect on IOC and CBD exploration was performed and the stone was removed.  Since her surgery she has noticed some itching around her bandages on her abdominal area.  Last night she notes a temperature of 101.  She also has new drainage from the central abdominal wound.  She spoke to her surgeon who she is scheduled to see for follow-up and recommended that she comes to the emergency department for evaluation. she also notes lower abd pain. no urinary symptoms.

## 2022-10-21 NOTE — ED PROVIDER NOTE - NSFOLLOWUPINSTRUCTIONS_ED_ALL_ED_FT
You were seen in the emergency department for abdominal pain.     Please follow-up with your primary care doctor in the next 24-48 hours.     Please follow-up with your surgeon as previously scheduled.     If you have any worsening symptoms, severe abdominal pain, chest pain, trouble breathing, or you are unable to tolerate food or fluids, please return to the emergency department.

## 2022-10-21 NOTE — ED PROVIDER NOTE - NS ED ROS FT
Constitutional: no fever no chills  Eyes: no conjunctivitis  Ears: no ear pain no tinnitus  Nose: no nasal congestion, Mouth/Throat: no throat pain, Neck: no stiffness  Cardiovascular: no chest pain  Chest: no cough  Gastrointestinal: + abdominal pain,  vomiting and diarrhea  MSK: no joint pain  :  dysuria  Skin: no rash  Neuro: no LOC

## 2022-10-21 NOTE — ED ADULT NURSE NOTE - OBJECTIVE STATEMENT
as per pt " I had the cholecystectomy on 9 / 29 and my wound is note getting healed and I had fever "

## 2022-10-21 NOTE — ED ADULT NURSE NOTE - CHIEF COMPLAINT QUOTE
Digital Medicine: Health  Follow-Up        Follow Up  Follow-up reason(s): reading review and routine education      Readings are trending up Patient stated that he got a new BP cuff at the end of December 2019 (he had [employer] but changed to [employer]). He stated that he liked the [employer] cuff better so this has been an adjustment for him. We reviewed proper technique and sitting position. The patient is still at the kitchen table and it appears that he is using the cuff correctly but he is going to continue to work on his technique. He will also make sure to charge his BP cuff every few weeks. Patient denies symptoms related to higher BP readings.        Intervention/Plan    There are no preventive care reminders to display for this patient.    Last 5 Patient Entered Readings                                      Current 30 Day Average: 136/79     Recent Readings 1/12/2020 1/10/2020 1/9/2020 1/8/2020 1/7/2020    SBP (mmHg) 132 136 136 144 150    DBP (mmHg) 70 69 102 86 92    Pulse 65 66 73 61 61                  Screenings    SDOH  
s/p gallbladder surgery 9/29/22 with Dr Flores reports abdominal pain, fever overnight of 101 and one of my incisions had pus last night. Tylenol taken last at 6am

## 2022-10-21 NOTE — ED ADULT NURSE NOTE - SUICIDE SCREENING QUESTION 1
PT ORIENTED TO ROOM AND UNIT. BED LOW AND LOCKED, SIDE RAILS UP X3, CALL LIGHT
IN REACH. TELE APPLIED AND MRI TECH CALLED INTO HOSPITAL. WILL CONTINUE TO
ASSESS. No

## 2022-10-23 LAB
CULTURE RESULTS: SIGNIFICANT CHANGE UP
SPECIMEN SOURCE: SIGNIFICANT CHANGE UP

## 2022-10-25 ENCOUNTER — APPOINTMENT (OUTPATIENT)
Dept: SURGERY | Facility: CLINIC | Age: 47
End: 2022-10-25

## 2022-10-25 VITALS
HEIGHT: 60 IN | DIASTOLIC BLOOD PRESSURE: 70 MMHG | WEIGHT: 137 LBS | HEART RATE: 83 BPM | BODY MASS INDEX: 26.9 KG/M2 | SYSTOLIC BLOOD PRESSURE: 112 MMHG

## 2022-10-25 DIAGNOSIS — K80.20 CALCULUS OF GALLBLADDER W/OUT CHOLECYSTITIS W/OUT OBSTRUCTION: ICD-10-CM

## 2022-10-25 PROCEDURE — 99024 POSTOP FOLLOW-UP VISIT: CPT

## 2022-10-26 LAB
CULTURE RESULTS: SIGNIFICANT CHANGE UP
CULTURE RESULTS: SIGNIFICANT CHANGE UP
SPECIMEN SOURCE: SIGNIFICANT CHANGE UP
SPECIMEN SOURCE: SIGNIFICANT CHANGE UP

## 2022-11-01 NOTE — ASSESSMENT
[FreeTextEntry1] : ELENITA SNOW is a 47 year old female who underwent a Laparoscopic cholecystectomy with cholangiography. Laparoscopic transcystic common bile duct exploration.Choledochoscopy on 09/30/2022 pathology results are consistent with Chronic calculous cholecystitis\par \par \par Patient is doing well, offers no complaints. Denies any fevers, chills, nausea, vomiting, diarrhea or constipation. Patient mild abdominal incision site healing well, no more drainage noted.  Patient's questions and concerns addressed to patient's satisfaction. \par \par \par

## 2022-11-01 NOTE — PLAN
[FreeTextEntry1] : Please follow up at the office as needed and as needed for any questions or concerns

## 2022-11-01 NOTE — PHYSICAL EXAM
[Alert] : alert [Oriented to Person] : oriented to person [Oriented to Place] : oriented to place [Oriented to Time] : oriented to time [Calm] : calm [de-identified] : The patient is alert, well-groomed  [de-identified] : Breath sounds equal and bilateral, no wheezing no rales or rhonchi  [de-identified] :  good S1, S2, no murmurs, rubs, gallops bilateral  [de-identified] : full range of motion and no deformities appreciated.  [de-identified] : Incision sites are healing well, some erythema around the incision sites posible due to the tape

## 2022-11-03 ENCOUNTER — APPOINTMENT (OUTPATIENT)
Dept: SURGERY | Facility: HOSPITAL | Age: 47
End: 2022-11-03

## 2022-11-14 ENCOUNTER — APPOINTMENT (OUTPATIENT)
Dept: GASTROENTEROLOGY | Facility: HOSPITAL | Age: 47
End: 2022-11-14

## 2023-05-05 NOTE — ED ADULT NURSE NOTE - NS ED NURSE DISCH DISPOSITION
DATE: 5/5/2023  PATIENT: Gloria Hartley  Referred By: Dashawn Rodríguez MD    [facility] MULTI-DISCIPLINARY BREAST ONCOLOGY CLINIC  Medical oncology consultation    Cancer Staging   Malignant neoplasm of upper-inner quadrant of right breast in female, estrogen receptor positive (CMD)  Staging form: Breast, AJCC 8th Edition  - Clinical: Stage IA (cT1b, cN0, cM0, G1, ER+, NM+, HER2-) - Signed by Rhonda Zaidi MD on 5/5/2023    Cancer Genomics:   Genetics Testing: Has been ordered but has not yet completed  Goal of Treatment: Curative        CHIEF COMPLAINT::  Gloria Hartley is a 44 year old female whom I am seeing at the kind request of Dashawn Rodríguez MD for further evaluation and management of screen detected right breast cancer.    Hematology/Oncology Summary:  March 2023-8 mm mass with a spiculated margin in the right breast 12:00.  April 2023 -right breast biopsy 1:00 invasive ductal carcinoma grade 1, ER 90, NM 90, HER2 1+.  Ki-67 5%.      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  She is otherwise healthy, premenopausal, having regular menses, using condoms as the contraceptive methods, completed her family and had an abnormal mammogram during her first PCP visit.  This has eventually led to the diagnosis of grade 1 right breast cancer.  She is here with her spouse for management discussion.    Patient denies fever, chills, drenching night sweats, weight loss, anorexia, fatigue, dyspnea.    Patient's medications, allergies, past medical, surgical, social and family histories were reviewed and updated as appropriate.    PAST MEDICAL HISTORY:     Past Medical History:   Diagnosis Date   • No known problems        PAST SURGICAL HISTORY:     Past Surgical History:   Procedure Laterality Date   • D and c         FAMILY MEDICAL HISTORY:     Family History   Problem Relation Age of Onset   • Cancer Maternal  Uncle        SOCIAL HISTORY:   Social History     Tobacco Use   • Smoking status: Never   • Smokeless tobacco: Never   Vaping Use   • Vaping status: never used     Passive vaping exposure: Yes   Substance Use Topics   • Alcohol use: Yes     Alcohol/week: 3.0 standard drinks of alcohol     Types: 1 Glasses of wine, 1 Cans of beer, 1 Shots of liquor per week   • Drug use: Never         MEDICATIONS:     Current Outpatient Medications   Medication Sig   • BinaxNOW COVID-19 Ag Home Test Kit FOLLOW INSTRUCTIONS INCLUDED WITH THE PACKAGE.     No current facility-administered medications for this visit.       ALLERGIES:    ALLERGIES:   Allergen Reactions   • Ragweed Other (See Comments)     Watery eyes runny nose           REVIEW OF SYSTEMS:    Psychosocial assessment was obtained. Intervention was not necessary.    ECOG Performance Status: 0 - Fully active, able to carry on all pre-disease activities without restrictions.    Pain Scale: None    Treatment Related Toxicites: None    Review of Systems   Constitutional: Negative for appetite change, chills, fever and unexpected weight change.   HENT: Negative for facial swelling, mouth sores, nosebleeds, sinus pain, trouble swallowing and voice change.    Eyes: Negative for visual disturbance.   Respiratory: Negative for chest tightness and shortness of breath.    Gastrointestinal: Negative for anal bleeding, constipation, diarrhea, nausea, rectal pain and vomiting.   Endocrine: Negative for cold intolerance and heat intolerance.   Genitourinary: Negative for difficulty urinating, dysuria, flank pain and hematuria.   Musculoskeletal: Negative for arthralgias, back pain, joint swelling and neck pain.   Skin: Negative for color change and rash.   Neurological: Negative for dizziness, speech difficulty, weakness, light-headedness and headaches.   Hematological: Negative for adenopathy. Does not bruise/bleed easily.   Psychiatric/Behavioral: Negative for dysphoric mood,  self-injury, sleep disturbance and suicidal ideas. The patient is not nervous/anxious.            Vitals:  Visit Vitals  /75 (BP Location: LUE - Left upper extremity, Patient Position: Sitting, Cuff Size: Regular)   Pulse 93   Temp 98 °F (36.7 °C) (Oral)   Ht 5' 3\" (1.6 m)   Wt 54.3 kg (119 lb 11.4 oz)   LMP 02/13/2023 (Exact Date)   SpO2 96%   BMI 21.21 kg/m²     Body Surface Area: Body surface area is 1.55 meters squared.    Physical Exam:  Physical Exam  Constitutional:       General: She is not in acute distress.     Appearance: She is well-developed.   HENT:      Head: Normocephalic and atraumatic.      Mouth/Throat:      Pharynx: No oropharyngeal exudate.      Neck: Normal range of motion and neck supple.   Eyes:      General: No scleral icterus.        Right eye: No discharge.         Left eye: No discharge.      Conjunctiva/sclera: Conjunctivae normal.   Neck:      Thyroid: No thyromegaly.   Pulmonary:      Effort: Pulmonary effort is normal. No respiratory distress.      Breath sounds: Normal breath sounds. No wheezing or rales.   Chest:      Chest wall: No tenderness.   Abdominal:      General: Bowel sounds are normal. There is no distension.      Palpations: Abdomen is soft. There is no mass.      Tenderness: There is no abdominal tenderness. There is no guarding or rebound.   Musculoskeletal:         General: Normal range of motion.      Right lower leg: No edema.      Left lower leg: No edema.   Lymphadenopathy:      Cervical: No cervical adenopathy.   Skin:     General: Skin is warm and dry.      Coloration: Skin is not jaundiced or pale.      Findings: No erythema or rash.   Neurological:      Mental Status: She is alert and oriented to person, place, and time.      Coordination: Coordination normal.   Psychiatric:         Behavior: Behavior normal.         Thought Content: Thought content normal.         Judgment: Judgment normal.         LABS:    No results found for: WBC  No results found  for: HCT  No results found for: HGB  No results found for: PLT  Glucose (mg/dL)   Date Value   02/27/2023 94     Sodium (mmol/L)   Date Value   02/27/2023 138     Potassium (mmol/L)   Date Value   02/27/2023 3.3 (L)     Chloride (mmol/L)   Date Value   02/27/2023 105     Carbon Dioxide (mmol/L)   Date Value   02/27/2023 29     BUN (mg/dL)   Date Value   02/27/2023 11     Creatinine (mg/dL)   Date Value   02/27/2023 0.59     Calcium (mg/dL)   Date Value   02/27/2023 8.7     Anion Gap (mmol/L)   Date Value   02/27/2023 7        PATHOLOGY:  A.  Right breast, 1:00, 5 cm from nipple; biopsy:   -Invasive ductal carcinoma, grade 1 with tubular features and associated calcifications.  The largest contiguous focus of invasive carcinoma measures 6 mm.  See comment.       IMAGING:  IMPRESSION:  MAMMOGRAPHY  INCOMPLETE NEED ADDITIONAL IMAGING EVALUATION     The 8 mm mass in the right breast is worrisome for malignancy.  An ultrasound is recommended.      ASSESSMENT/PLAN:    1. Malignant neoplasm of upper-inner quadrant of right breast in female, estrogen receptor positive (CMD)   Stage I right breast cancer   Early stage, very good prognosis.   Will undergo breast MRI and if no other abnormalities, we will proceed to surgery.   Patient has decided on lumpectomy with sentinel node biopsy.   Based on the final pathology will likely send Oncotype score.   Currently the immunohistochemistry history markers suggest a low-grade tumor.   Further management to be decided based on Oncotype score.   If low, would proceed with tamoxifen.   If intermediate, to consider ovarian suppression along with endocrine therapy.   May also have to consider chemotherapy.        ORDERS:  Orders Placed This Encounter   • XR CHEST PA AND LATERAL 2 VIEWS   • MRI BREAST DIAGNOSTIC BILATERAL W WO CONTRAST   • CBC with Automated Differential   • Comprehensive Metabolic Panel   • SERVICE TO GENETICS   • Electrocardiogram 12-Lead         FOLLOW UP:  2 to 4  weeks after lumpectomy.    DISCUSSION:  Screen detected right breast invasive ductal carcinoma, hormone positive, HER-2 negative, clinically early-stage.    We reviewed the diagnosis of invasive ductal carcinoma, which is a common type of histopathologic breast cancer seen in most women.  We reviewed the diagnosis with particular reference to the hormonal, lymph node status and HER-2 markers which are important prognostically and therapeutically.  We spent some time understanding how the ER and UT and HER-2 are involved and breast cancer growth and how targeting these receptors have resulted in important treatment implications.    We discussed about her individual cancer which is hormone positive and HER-2 negative and based on the size, carries a very good prognosis.    We then discussed about usual treatment modalities for breast cancer which involves surgery -either lumpectomy or mastectomy with lymph node assessments, systemic chemotherapy in adjuvant or neoadjuvant fashion, breast radiation and thereafter prolonged endocrine therapy for hormone positive breast cancer.  The chronology of these treatment modalities differs based on individual stage, tumor type and receptor status.    For HER-2 positive tumors, we reviewed the data for neoadjuvant/adjuvant anti-HER-2 treatments.    we also discussed about Oncotype recurrence score which helps us decide if adjuvant chemotherapy would be indicated in her condition. We reviewed the process of obtaining the any material from tumor tissue, analyzing the type of genes involved in proliferation, use of the recurrence score in predicting benefit of chemotherapy and chance of distant recurrence. We will order the Oncotype on the final pathology specimen.    At the end of the evaluation, the patient was asked if all complaints had been addressed today to full  satisfaction. They responded affirmatively. I spent 60 minutes face to face with the patient for this evaluation,  reviewing the diagnosis, prognosis, overview of the treatment, potential toxicities, including counseling on use of medications for symptoms and making necessary referrals and answering all their questions.          Learning needs assessed. Learning intervention was not required.    Above plan was discussed with patient and family and all pertinent questions were answered. At the end of the evaluation, the patient was asked if all complaints had been addressed today to her satisfaction and she responded affirmatively.      Thank you for allowing me to participate in your patient's healthcare management. Please feel free to contact me with any questions.    Sincerely,    Rhonda Zaidi MD   Admitted

## 2024-02-26 NOTE — ED PROVIDER NOTE - PATIENT PORTAL LINK FT
[Negative] : Heme/Lymph [de-identified] : left upper extremity swelling  [de-identified] : right finger numbness You can access the FollowMyHealth Patient Portal offered by [facility] by registering at the following website: [email or web address]. By joining [facility]’s FollowMyHealth portal, you will also be able to view your health information using other applications (apps) compatible with our system.

## 2024-06-03 NOTE — PATIENT PROFILE ADULT - NSPROIMPLANTSMEDDEV_GEN_A_NUR
file   Housing Stability: Unknown (6/3/2024)    Housing Stability Vital Sign     Unable to Pay for Housing in the Last Year: Not on file     Number of Places Lived in the Last Year: Not on file     Unstable Housing in the Last Year: No     Family History   Adopted: Yes   Allergies:  Penicillins  Patient Active Problem List   Diagnosis    Chronic low back pain    Positive colorectal cancer screening using DNA-based stool test    Polyp of colon    Tortuous colon     Current Outpatient Medications on File Prior to Visit   Medication Sig Dispense Refill    buprenorphine-naloxone (SUBOXONE) 8-2 MG FILM SL film DISSOLVE 1 FILM UNDER TONGUE TWICE A DAY      amLODIPine (NORVASC) 2.5 MG tablet Take 1 tablet by mouth daily 30 tablet 3    alendronate (FOSAMAX) 70 MG tablet Take 1 tablet by mouth every 7 days 12 tablet 5    Varenicline Tartrate, Starter, (CHANTIX STARTING MONTH AMPARO) 0.5 MG X 11 & 1 MG X 42 TBPK Step 1: days 1 to 3: take 1 tablet once daily. Days 4 to 7: 1 tablet twice daily. Then continue at 2 tablets twice daily for 3 weeks Total 53 tablets 53 each 0    varenicline (CHANTIX CONTINUING MONTH AMPARO) 1 MG tablet Step 2. Continue for the next 8 weeks: take 1 tablet twice daily. 14 tabs per week, 7 refills. Total 8 weeks 60 tablet 3    DULoxetine (CYMBALTA) 30 MG extended release capsule Take 1 capsule by mouth daily      busPIRone (BUSPAR) 15 MG tablet Take 15 mg by mouth 3 times daily      [facility] SENNA PLUS 8.6-50 MG per tablet TAKE 2 TABLETS BY MOUTH DAILY AS NEEDED FOR CONSTIPATION. 60 tablet 3    hydroxychloroquine (PLAQUENIL) 200 MG tablet       buprenorphine-naloxone (SUBOXONE) 2-0.5 MG FILM SL film Place under the tongue.      gabapentin (NEURONTIN) 100 MG capsule TAKE 2 CAPSULES BY MOUTH EVERY 8 HOURS. 180 capsule 3     No current facility-administered medications on file prior to visit.     Review of Systems   Constitutional:  Negative for chills, diaphoresis, fatigue and fever.   HENT:  Negative for  None

## 2025-07-30 NOTE — HISTORY OF PRESENT ILLNESS
Please send antibiotic for patients prostate biopsy to [facility] in [place].    Thank you  
[de-identified] : ELENITA SNOW is a 47 year old female who presents in the office for postop  visit. She underwent a Laparoscopic cholecystectomy with cholangiography. Laparoscopic transcystic common bile duct exploration.Choledochoscopy on 09/30/2022. Patient had a recent ER visit for abdominal pain and temp of 101 F.  Today patient is doing well, offers no complaints. Denies any fevers, chills, nausea, vomiting, diarrhea or constipation. Patient mild abdominal incision site healing well, no more drainage noted.

## (undated) DEVICE — PRECISION CUT SCISSOR MICROLINE MINI ENDOCUT DISP

## (undated) DEVICE — ELCTR BOVIE BLADE 3/4" EXTENDED LENGTH 6"

## (undated) DEVICE — SYR LUER LOK 10CC

## (undated) DEVICE — TUBING STRYKEFLOW II SUCTION / IRRIGATOR

## (undated) DEVICE — FOR-ESU VALLEYLAB T7E14999DX: Type: DURABLE MEDICAL EQUIPMENT

## (undated) DEVICE — SUT POLYSORB 0 30" GU-46

## (undated) DEVICE — TUBING STRYKER PNEUMOSURE HEATED RTP

## (undated) DEVICE — TROCAR COVIDIEN VERSAPORT BLADELESS OPTICAL 5MM STANDARD

## (undated) DEVICE — PRESSURE INFUSOR BAG 1000ML

## (undated) DEVICE — SOL IRR POUR NS 0.9% 1500ML

## (undated) DEVICE — STOPCOCK 4-WAY (BLUE) DISCOFIX SPIN-LOCK CONNECTOR

## (undated) DEVICE — WARMING BLANKET UPPER ADULT

## (undated) DEVICE — DRSG STERISTRIPS 0.5 X 4"

## (undated) DEVICE — GLV 8.5 PROTEXIS (WHITE)

## (undated) DEVICE — SPONGE ENDO PEANUT 5MM

## (undated) DEVICE — DRAIN JACKSON PRATT 10MM FLAT 3/4 NO TROCAR

## (undated) DEVICE — TROCAR COVIDIEN ENDO CLOSE SUTURING DEVICE

## (undated) DEVICE — DRAPE C ARM UNIVERSAL

## (undated) DEVICE — VENODYNE/SCD SLEEVE CALF MEDIUM

## (undated) DEVICE — STAPLER SKIN VISI-STAT 35 WIDE

## (undated) DEVICE — ENDOCATCH 10MM SPECIMEN POUCH

## (undated) DEVICE — ELCTR GROUNDING PAD ADULT COVIDIEN

## (undated) DEVICE — INSUFFLATION NDL COVIDIEN SURGINEEDLE VERESS 120MM

## (undated) DEVICE — SUT BIOSYN 4-0 18" P-12

## (undated) DEVICE — SYR ASEPTO

## (undated) DEVICE — TROCAR COVIDIEN VERSAPORT BLADELESS OPTICAL 12MM STANDARD

## (undated) DEVICE — SOL INJ NS 0.9% 1000ML

## (undated) DEVICE — DRAIN RESERVOIR FOR JACKSON PRATT 100CC CARDINAL

## (undated) DEVICE — SUT VICRYL 0 18" ENDOLOOP LIGATURE

## (undated) DEVICE — BLADE SURGICAL #10 CARBON

## (undated) DEVICE — PACK GENERAL LAPAROSCOPY

## (undated) DEVICE — D HELP - CLEARVIEW CLEARIFY SYSTEM

## (undated) DEVICE — SUCTION YANKAUER NO CONTROL VENT

## (undated) DEVICE — SUT HISTOACRYL BLUE

## (undated) DEVICE — SUT PDS II 0 18" ENDOLOOP LIGATURE

## (undated) DEVICE — BLADE SURGICAL #15 CARBON

## (undated) DEVICE — DRAPE LIGHT HANDLE COVER (BLUE)

## (undated) DEVICE — BASIN SET SINGLE

## (undated) DEVICE — GLV 8 PROTEXIS (WHITE)

## (undated) DEVICE — NDL HYPO REGULAR BEVEL 25G X 1.5" (BLUE)

## (undated) DEVICE — TUBING TRUWAVE PRESSURE MALE/FEMALE 72"